# Patient Record
Sex: MALE | Race: WHITE | NOT HISPANIC OR LATINO | Employment: OTHER | URBAN - METROPOLITAN AREA
[De-identification: names, ages, dates, MRNs, and addresses within clinical notes are randomized per-mention and may not be internally consistent; named-entity substitution may affect disease eponyms.]

---

## 2022-01-04 ENCOUNTER — NEW PATIENT (OUTPATIENT)
Dept: URBAN - METROPOLITAN AREA CLINIC 49 | Facility: CLINIC | Age: 67
End: 2022-01-04

## 2022-01-04 DIAGNOSIS — H16.223: ICD-10-CM

## 2022-01-04 DIAGNOSIS — H25.812: ICD-10-CM

## 2022-01-04 PROCEDURE — 92004 COMPRE OPH EXAM NEW PT 1/>: CPT

## 2022-01-04 PROCEDURE — 92134 CPTRZ OPH DX IMG PST SGM RTA: CPT

## 2022-01-04 ASSESSMENT — VISUAL ACUITY
OD_GLARE: 20/20
OS_GLARE: 20/40
OS_GLARE: 20/30
OS_CC: 20/60
OS_CC: J2 @ 14IN
OS_PH: 20/40
OD_CC: J1+ @ 14IN
OD_GLARE: 20/20
OU_CC: J1+ @ 14IN
OD_CC: 20/20
OU_CC: 20/20

## 2022-01-04 ASSESSMENT — TONOMETRY
OS_IOP_MMHG: 18
OD_IOP_MMHG: 18

## 2022-01-21 ENCOUNTER — DIAGNOSTICS ONLY (OUTPATIENT)
Dept: URBAN - METROPOLITAN AREA CLINIC 49 | Facility: CLINIC | Age: 67
End: 2022-01-21

## 2022-01-21 DIAGNOSIS — H25.812: ICD-10-CM

## 2022-01-21 PROCEDURE — 92025IOL CORNEAL TOPOGRAPHY PREMIUM IOL

## 2022-01-21 PROCEDURE — 92136 OPHTHALMIC BIOMETRY: CPT

## 2022-01-21 ASSESSMENT — KERATOMETRY
OS_K2POWER_DIOPTERS: 41.50
OS_K1POWER_DIOPTERS: 42.37
OS_AXISANGLE_DEGREES: 008
OS_AXISANGLE2_DEGREES: 98

## 2022-01-25 ENCOUNTER — PRE-OP/H&P (OUTPATIENT)
Dept: URBAN - METROPOLITAN AREA CLINIC 49 | Facility: CLINIC | Age: 67
End: 2022-01-25

## 2022-01-25 DIAGNOSIS — H25.812: ICD-10-CM

## 2022-01-25 PROCEDURE — PREOP PRE OP VISIT

## 2022-01-25 ASSESSMENT — KERATOMETRY
OS_AXISANGLE_DEGREES: 008
OS_AXISANGLE2_DEGREES: 98
OS_K1POWER_DIOPTERS: 42.37
OS_K2POWER_DIOPTERS: 41.50

## 2022-01-25 ASSESSMENT — VISUAL ACUITY
OS_SC: 20/40
OD_SC: 20/25-2
OS_PH: 20/30

## 2022-01-25 ASSESSMENT — TONOMETRY
OD_IOP_MMHG: 18
OS_IOP_MMHG: 17

## 2022-01-25 NOTE — PATIENT DISCUSSION
CATARACT SURGERY PLANNER - STANDARD IOL/NO FEMTO: Phacoemulsification with IOL: Eye: OS|DOS: 2/3/2022|Model: SN60WF|Power: 22. 0|Target: PLANO|Visc: DUET|Omidria: YES|10% Phenylephrine: YES|Epi-shugarcaine: YES|Phaco Setting: STD|Olive Tip: +/-|Pupilloplasty: +/-|Notes: Plan: SN60WF Target [place] OS. OD  done elsewhere 2014. Hx: No Macular Pathology. DILATES to 4.5MM.

## 2022-02-02 ASSESSMENT — KERATOMETRY
OS_AXISANGLE2_DEGREES: 98
OS_K2POWER_DIOPTERS: 41.50
OS_K1POWER_DIOPTERS: 42.37
OS_AXISANGLE_DEGREES: 008

## 2022-02-03 ENCOUNTER — SURGERY/PROCEDURE (OUTPATIENT)
Dept: URBAN - METROPOLITAN AREA SURGERY 16 | Facility: SURGERY | Age: 67
End: 2022-02-03

## 2022-02-03 ENCOUNTER — POST-OP (OUTPATIENT)
Dept: URBAN - METROPOLITAN AREA CLINIC 48 | Facility: CLINIC | Age: 67
End: 2022-02-03

## 2022-02-03 DIAGNOSIS — H25.812: ICD-10-CM

## 2022-02-03 DIAGNOSIS — Z98.42: ICD-10-CM

## 2022-02-03 DIAGNOSIS — Z96.1: ICD-10-CM

## 2022-02-03 PROCEDURE — 66984 XCAPSL CTRC RMVL W/O ECP: CPT

## 2022-02-03 PROCEDURE — 99024 POSTOP FOLLOW-UP VISIT: CPT

## 2022-02-03 ASSESSMENT — KERATOMETRY
OS_K1POWER_DIOPTERS: 42.37
OS_AXISANGLE2_DEGREES: 98
OS_AXISANGLE_DEGREES: 008
OS_K2POWER_DIOPTERS: 41.50

## 2022-02-03 ASSESSMENT — VISUAL ACUITY: OS_SC: 20/70

## 2022-02-03 ASSESSMENT — TONOMETRY: OS_IOP_MMHG: 23

## 2022-02-03 NOTE — PATIENT DISCUSSION
CATARACT SURGERY PLANNER - STANDARD IOL/NO FEMTO: Phacoemulsification with IOL: Eye: OS|DOS: 2/3/2022|Model: [id number]|Power: 22. 0|Target: PLANO|Visc: DUET|Omidria: YES|10% Phenylephrine: YES|Epi-shugarcaine: YES|Phaco Setting: STD|Olive Tip: +/-|Pupilloplasty: +/-|Notes: Plan: SN60WF Target [place] OS. OD  done elsewhere 2014. Hx: No Macular Pathology. DILATES to 4.5MM.

## 2022-02-08 ENCOUNTER — POST-OP (OUTPATIENT)
Dept: URBAN - METROPOLITAN AREA CLINIC 49 | Facility: CLINIC | Age: 67
End: 2022-02-08

## 2022-02-08 DIAGNOSIS — Z96.1: ICD-10-CM

## 2022-02-08 DIAGNOSIS — Z98.42: ICD-10-CM

## 2022-02-08 PROCEDURE — 99024 POSTOP FOLLOW-UP VISIT: CPT

## 2022-02-08 ASSESSMENT — VISUAL ACUITY
OD_SC: 20/40
OS_PH: 20/25-
OS_SC: 20/30+

## 2022-02-08 ASSESSMENT — TONOMETRY
OD_IOP_MMHG: 17
OS_IOP_MMHG: 17

## 2022-02-08 NOTE — PATIENT DISCUSSION
CATARACT SURGERY PLANNER - STANDARD IOL/NO FEMTO: Phacoemulsification with IOL: Eye: OS|DOS: 2/3/2022|Model: [id number]|Power: 22. 0|Target: PLANO|Visc: DUET|Omidria: YES|10% Phenylephrine: YES|Epi-shugarcaine: YES|Phaco Setting: STD|Olive Tip: +/-|Pupilloplasty: +/-|Notes: Plan: SN60WF Target [place] OS. OD  done elsewhere 2014. Hx: No Macular Pathology. DILATES to 4.5MM.

## 2022-03-08 ENCOUNTER — POST-OP (OUTPATIENT)
Dept: URBAN - METROPOLITAN AREA CLINIC 49 | Facility: CLINIC | Age: 67
End: 2022-03-08

## 2022-03-08 DIAGNOSIS — Z98.42: ICD-10-CM

## 2022-03-08 PROCEDURE — 99024 POSTOP FOLLOW-UP VISIT: CPT

## 2022-03-08 PROCEDURE — 92015 DETERMINE REFRACTIVE STATE: CPT

## 2022-03-08 ASSESSMENT — TONOMETRY
OD_IOP_MMHG: 15
OS_IOP_MMHG: 12

## 2022-03-08 ASSESSMENT — VISUAL ACUITY
OS_SC: J5@16"
OD_SC: J5@16"
OS_SC: 20/30+1
OD_SC: 20/40

## 2022-03-08 NOTE — PATIENT DISCUSSION
CATARACT SURGERY PLANNER - STANDARD IOL/NO FEMTO: Phacoemulsification with IOL: Eye: OS|DOS: 2/3/2022|Model: [id number]|Power: 22. 0|Target: PLANO|Visc: DUET|Omidria: YES|10% Phenylephrine: YES|Epi-shugarcaine: YES|Phaco Setting: STD|Olive Tip: +/-|Pupilloplasty: +/-|Notes: Plan: SN60WF Target [place] OS. OD  done elsewhere 2014. Hx: No Macular Pathology. DILATES to 4.5MM.